# Patient Record
Sex: FEMALE | Employment: OTHER | URBAN - METROPOLITAN AREA
[De-identification: names, ages, dates, MRNs, and addresses within clinical notes are randomized per-mention and may not be internally consistent; named-entity substitution may affect disease eponyms.]

---

## 2024-02-05 NOTE — H&P (VIEW-ONLY)
mGycm Maximum CTDI Vol : 47.34 mGy FINDINGS:  No evidence of acute stroke, mass, or hemorrhage. No fracture or extra-axial collection. Mild white matter density alteration is nonspecific, but compatible with chronic small vessel ischemia. Included portions of the paranasal sinuses, mastoids, and orbits unremarkable.     No acute intracranial abnormality. - Note: Radiology results need to be interpreted within a comprehensive clinical context.  If you have questions about the radiology report, please contact the office of the ordering clinician.    Reviewed     Microbiology:  No results found for this visit on 24 (from the past 336 hour(s)).   Reviewed    EKG:  Results for orders placed during the hospital encounter of 24    EK EKG 12 LEAD    Impression  [facility]    Test Date:    2024  Pat Name:     SALLY CONTEH               Department:   [facility]  Patient ID:   [id number]                 Room:         [facility]  Gender:       Female                   Technician:   Aarti  :          1955               Requested By: PARKER IRAHETA  Order Number: [id number]                Reading MD:   Lefty Yao MD  Measurements  Intervals                              Axis  Rate:         66                       P:            33  AL:           127                      QRS:          -18  QRSD:         98                       T:            17  QT:           395  QTc:          415  Interpretive Statements  SINUS RHYTHM  LOW QRS VOLTAGE IN PRECORDIAL LEADS  COMPARED TO 24 THE QRS AXIS HAS BECOME NORMNAL AND NSSTTWA HAVE  DISAPPEARED  Electronically Signed On 2024 8:08:51 EST by Lefty Yao MD    Reviewed        This note was completed using voice recognition technology. Despite the writer's best efforts to proof read, it may still contain unintended errors. Please call with questions.

## 2024-02-14 NOTE — PROGRESS NOTES
[facility] Pre-Admission Testing Electronic Communication Worksheet for OR/ENDO Procedures        Patient: Kayla Faith    DOS:   2/22  Arrival Time: 12    Surgery Time:1325    Meds to Bed:  [x] YES    []  NO    Transportation Confirmed: [x] YES    []  NO    History and Physical:  [x] YES    []  NO  [] N/A  If yes, please list doctor or Urgent Care and date of H&P: per [facility] notes    Additional Clearance(Cardiac, Pulmonary, etc):  [] YES    [x]  NO    Pre-Admission Testing Visit:  [] YES    [x]  NO If no, do labs/testing need to be done DOS?  [] YES    [x]  NO    Medication Reconciliation Complete:  [x] YES    []  NO        Additional Notes:                Interview Complete: [x] YES    []  NO          Coty Chirinos RN  3:46 PM

## 2024-02-20 RX ORDER — PRAMIPEXOLE DIHYDROCHLORIDE 1.5 MG/1
1.5 TABLET ORAL NIGHTLY
COMMUNITY
Start: 2017-01-01

## 2024-02-20 RX ORDER — FLUDROCORTISONE ACETATE 0.1 MG/1
0.1 TABLET ORAL 2 TIMES DAILY
COMMUNITY
Start: 2022-01-12

## 2024-02-20 RX ORDER — OMEPRAZOLE 40 MG/1
40 CAPSULE, DELAYED RELEASE ORAL 2 TIMES DAILY
COMMUNITY
Start: 2017-01-01

## 2024-02-20 RX ORDER — ATORVASTATIN CALCIUM 40 MG/1
40 TABLET, FILM COATED ORAL NIGHTLY
COMMUNITY
Start: 2022-08-05

## 2024-02-20 RX ORDER — ONDANSETRON 4 MG/1
4 TABLET, ORALLY DISINTEGRATING ORAL EVERY 8 HOURS PRN
COMMUNITY

## 2024-02-20 RX ORDER — ERGOCALCIFEROL 1.25 MG/1
50000 CAPSULE ORAL WEEKLY
COMMUNITY

## 2024-02-20 RX ORDER — GABAPENTIN 400 MG/1
400 CAPSULE ORAL 3 TIMES DAILY
COMMUNITY
Start: 2023-03-11

## 2024-02-21 ENCOUNTER — ANESTHESIA EVENT (OUTPATIENT)
Dept: OPERATING ROOM | Age: 69
End: 2024-02-21
Payer: MEDICARE

## 2024-02-22 ENCOUNTER — HOSPITAL ENCOUNTER (OUTPATIENT)
Age: 69
Setting detail: OUTPATIENT SURGERY
Discharge: HOME | End: 2024-02-22
Attending: UROLOGY | Admitting: UROLOGY
Payer: MEDICARE

## 2024-02-22 ENCOUNTER — ANESTHESIA (OUTPATIENT)
Dept: OPERATING ROOM | Age: 69
End: 2024-02-22
Payer: MEDICARE

## 2024-02-22 VITALS
SYSTOLIC BLOOD PRESSURE: 109 MMHG | RESPIRATION RATE: 18 BRPM | TEMPERATURE: 97.5 F | DIASTOLIC BLOOD PRESSURE: 61 MMHG | HEIGHT: 65 IN | BODY MASS INDEX: 34.91 KG/M2 | WEIGHT: 209.55 LBS | HEART RATE: 77 BPM | OXYGEN SATURATION: 97 %

## 2024-02-22 PROCEDURE — 3600000002 HC SURGERY LEVEL 2 BASE: Performed by: UROLOGY

## 2024-02-22 PROCEDURE — 2709999900 HC NON-CHARGEABLE SUPPLY: Performed by: UROLOGY

## 2024-02-22 PROCEDURE — 7100000010 HC PHASE II RECOVERY - FIRST 15 MIN: Performed by: UROLOGY

## 2024-02-22 PROCEDURE — 3700000001 HC ADD 15 MINUTES (ANESTHESIA): Performed by: UROLOGY

## 2024-02-22 PROCEDURE — 2580000003 HC RX 258: Performed by: STUDENT IN AN ORGANIZED HEALTH CARE EDUCATION/TRAINING PROGRAM

## 2024-02-22 PROCEDURE — 6370000000 HC RX 637 (ALT 250 FOR IP): Performed by: UROLOGY

## 2024-02-22 PROCEDURE — 3600000012 HC SURGERY LEVEL 2 ADDTL 15MIN: Performed by: UROLOGY

## 2024-02-22 PROCEDURE — 2500000003 HC RX 250 WO HCPCS

## 2024-02-22 PROCEDURE — 2580000003 HC RX 258: Performed by: UROLOGY

## 2024-02-22 PROCEDURE — 6360000002 HC RX W HCPCS: Performed by: UROLOGY

## 2024-02-22 PROCEDURE — 7100000000 HC PACU RECOVERY - FIRST 15 MIN: Performed by: UROLOGY

## 2024-02-22 PROCEDURE — 7100000011 HC PHASE II RECOVERY - ADDTL 15 MIN: Performed by: UROLOGY

## 2024-02-22 PROCEDURE — 6360000002 HC RX W HCPCS

## 2024-02-22 PROCEDURE — 3700000000 HC ANESTHESIA ATTENDED CARE: Performed by: UROLOGY

## 2024-02-22 PROCEDURE — 7100000001 HC PACU RECOVERY - ADDTL 15 MIN: Performed by: UROLOGY

## 2024-02-22 PROCEDURE — L8606 SYNTHETIC IMPLNT URINARY 1ML: HCPCS | Performed by: UROLOGY

## 2024-02-22 DEVICE — BULKAMID URETHRAL BULKING SYSTEM 2ML
Type: IMPLANTABLE DEVICE | Site: BLADDER | Status: FUNCTIONAL
Brand: BULKAMID

## 2024-02-22 RX ORDER — SODIUM CHLORIDE 0.9 % (FLUSH) 0.9 %
5-40 SYRINGE (ML) INJECTION PRN
Status: DISCONTINUED | OUTPATIENT
Start: 2024-02-22 | End: 2024-02-22 | Stop reason: HOSPADM

## 2024-02-22 RX ORDER — PHENYLEPHRINE HCL IN 0.9% NACL 1 MG/10 ML
SYRINGE (ML) INTRAVENOUS PRN
Status: DISCONTINUED | OUTPATIENT
Start: 2024-02-22 | End: 2024-02-22 | Stop reason: SDUPTHER

## 2024-02-22 RX ORDER — FENTANYL CITRATE 50 UG/ML
INJECTION, SOLUTION INTRAMUSCULAR; INTRAVENOUS PRN
Status: DISCONTINUED | OUTPATIENT
Start: 2024-02-22 | End: 2024-02-22 | Stop reason: SDUPTHER

## 2024-02-22 RX ORDER — GLYCOPYRROLATE 0.2 MG/ML
INJECTION INTRAMUSCULAR; INTRAVENOUS PRN
Status: DISCONTINUED | OUTPATIENT
Start: 2024-02-22 | End: 2024-02-22 | Stop reason: SDUPTHER

## 2024-02-22 RX ORDER — SODIUM CHLORIDE 9 MG/ML
INJECTION, SOLUTION INTRAVENOUS PRN
Status: DISCONTINUED | OUTPATIENT
Start: 2024-02-22 | End: 2024-02-22 | Stop reason: HOSPADM

## 2024-02-22 RX ORDER — LIDOCAINE HYDROCHLORIDE 20 MG/ML
INJECTION, SOLUTION EPIDURAL; INFILTRATION; INTRACAUDAL; PERINEURAL PRN
Status: DISCONTINUED | OUTPATIENT
Start: 2024-02-22 | End: 2024-02-22 | Stop reason: SDUPTHER

## 2024-02-22 RX ORDER — SODIUM CHLORIDE 0.9 % (FLUSH) 0.9 %
5-40 SYRINGE (ML) INJECTION EVERY 12 HOURS SCHEDULED
Status: DISCONTINUED | OUTPATIENT
Start: 2024-02-22 | End: 2024-02-22 | Stop reason: HOSPADM

## 2024-02-22 RX ORDER — ONDANSETRON 2 MG/ML
4 INJECTION INTRAMUSCULAR; INTRAVENOUS
Status: DISCONTINUED | OUTPATIENT
Start: 2024-02-22 | End: 2024-02-22 | Stop reason: HOSPADM

## 2024-02-22 RX ORDER — FENTANYL CITRATE 0.05 MG/ML
25 INJECTION, SOLUTION INTRAMUSCULAR; INTRAVENOUS EVERY 5 MIN PRN
Status: DISCONTINUED | OUTPATIENT
Start: 2024-02-22 | End: 2024-02-22 | Stop reason: HOSPADM

## 2024-02-22 RX ORDER — MAGNESIUM HYDROXIDE 1200 MG/15ML
LIQUID ORAL
Status: COMPLETED | OUTPATIENT
Start: 2024-02-22 | End: 2024-02-22

## 2024-02-22 RX ORDER — PROPOFOL 10 MG/ML
INJECTION, EMULSION INTRAVENOUS PRN
Status: DISCONTINUED | OUTPATIENT
Start: 2024-02-22 | End: 2024-02-22 | Stop reason: SDUPTHER

## 2024-02-22 RX ADMIN — GLYCOPYRROLATE 0.2 MG: 0.2 INJECTION INTRAMUSCULAR; INTRAVENOUS at 14:08

## 2024-02-22 RX ADMIN — PROPOFOL 50 MG: 10 INJECTION, EMULSION INTRAVENOUS at 13:53

## 2024-02-22 RX ADMIN — FENTANYL CITRATE 25 MCG: 50 INJECTION INTRAMUSCULAR; INTRAVENOUS at 14:04

## 2024-02-22 RX ADMIN — Medication 200 MCG: at 14:12

## 2024-02-22 RX ADMIN — PROPOFOL 50 MG: 10 INJECTION, EMULSION INTRAVENOUS at 13:56

## 2024-02-22 RX ADMIN — PROPOFOL 50 MG: 10 INJECTION, EMULSION INTRAVENOUS at 13:51

## 2024-02-22 RX ADMIN — Medication 100 MCG: at 14:01

## 2024-02-22 RX ADMIN — LIDOCAINE HYDROCHLORIDE 80 MG: 20 INJECTION, SOLUTION EPIDURAL; INFILTRATION; INTRACAUDAL; PERINEURAL at 13:51

## 2024-02-22 RX ADMIN — Medication 200 MCG: at 14:18

## 2024-02-22 RX ADMIN — Medication 100 MCG: at 14:06

## 2024-02-22 RX ADMIN — SODIUM CHLORIDE: 9 INJECTION, SOLUTION INTRAVENOUS at 12:41

## 2024-02-22 RX ADMIN — Medication 200 MCG: at 14:21

## 2024-02-22 RX ADMIN — CEFTRIAXONE 2000 MG: 2 INJECTION, POWDER, FOR SOLUTION INTRAMUSCULAR; INTRAVENOUS at 13:51

## 2024-02-22 RX ADMIN — FENTANYL CITRATE 25 MCG: 50 INJECTION INTRAMUSCULAR; INTRAVENOUS at 14:03

## 2024-02-22 RX ADMIN — PROPOFOL 150 MCG/KG/MIN: 10 INJECTION, EMULSION INTRAVENOUS at 13:52

## 2024-02-22 ASSESSMENT — PAIN - FUNCTIONAL ASSESSMENT
PAIN_FUNCTIONAL_ASSESSMENT: NONE - DENIES PAIN
PAIN_FUNCTIONAL_ASSESSMENT: 0-10
PAIN_FUNCTIONAL_ASSESSMENT: NONE - DENIES PAIN
PAIN_FUNCTIONAL_ASSESSMENT: NONE - DENIES PAIN

## 2024-02-22 NOTE — INTERVAL H&P NOTE
Update History & Physical    The patient's History and Physical  was reviewed with the patient and I examined the patient. There was no change. The surgical site was confirmed by the patient and me.     Plan: The risks, benefits, expected outcome, and alternative to the recommended procedure have been discussed with the patient. Patient understands and wants to proceed with the procedure.     Electronically signed by Yesi Bustamante MD on 2/22/2024 at 1:31 PM

## 2024-02-22 NOTE — PROGRESS NOTES
Received from PACU. Admitted to Phase 2 care.  Awake and alert, respirations easy and even.  Oriented to room and surroundings.  No complaints.

## 2024-02-22 NOTE — OP NOTE
bladder.  I pulled the scope back to the level of the mid urethra about 2 cm back from the bladder neck.  The Bulkamid was then injected in 0.5 mL increments.  I injected 0.5 mL at the 7 o'clock position followed by 0.5 mL at the 5 o'clock position, 0.5 mL at the 1 o'clock position, and 0.5 mL at the 11 o'clock position.  This resulted in excellent coaptation.  The scope was withdrawn.  A red rubber catheter was used to drain the bladder.  Then, the catheter was withdrawn.  The patient was awakened and transferred to Recovery.    POSTOPERATIVE PLAN:  She will have a voiding trial in the recovery room and follow up in the office in 2 weeks.          FABI ESPINOSA MD      D:  02/22/2024 14:22:15     T:  02/22/2024 15:09:53     CLAY/ALYSA  Job #:  [id number]     Doc#:  [id number]

## 2024-02-22 NOTE — PROGRESS NOTES
Pt states urge to urinate, placed on bedpan. Small amount of urine noted. Marry care performed. Pt still denies pain or nausea at this time, aware will need to urinate before Dc.

## 2024-02-22 NOTE — PROGRESS NOTES
Assisted to and from BR to void.  States she voided \"good\".  States she is ready to go home.  No complaints.

## 2024-02-22 NOTE — PROGRESS NOTES
Pt to pacu from OR. Pt asleep with oral airway in place, on 6L via nc per CRNA, placed on monitor, vss. Abdo soft to touch, no pain with palpation. No sings of distress noted at this time, report obtained.

## 2024-02-22 NOTE — DISCHARGE INSTRUCTIONS
Please make sure the patient is able to urinate prior to discharge home.    No diet or activity restrictions from urology standpoint.    A small amount of blood in the urine or burning with urination is expected.    Call for inability to urinate or fever    Follow up in the office in 2 weeks

## 2024-02-22 NOTE — PROGRESS NOTES
Pt chewing on oral airway removed. Pt oriented to pacu, denies pain or nausea at this time, readjusted to sit up in bed, on 3L via Nc at this time, sating 95%. Falls back to sleep easily.

## 2024-02-22 NOTE — PROGRESS NOTES
Tolerating oral intake and being up on side of bed.  Discharge instructions given to patient and friend Guadalupe.  Verbalize understanding.  No complaints.

## 2024-02-22 NOTE — BRIEF OP NOTE
Brief Postoperative Note      Patient: Kayla Faith  YOB: 1955  MRN: [id number]    Date of Procedure: 2/22/2024    Pre-Op Diagnosis Codes:     * Female stress incontinence [N39.3], urge incontinence    Post-Op Diagnosis: Same       Procedure(s):  CYSTOSCOPY WITH INJECTION OF URETHRAL BULKING AGENT- BULKAMID, AND INTRAVESICAL INJECTION  UNITS OF BOTOX    Surgeon(s):  Yesi Bustamante MD    Assistant:  Surgical Assistant: Ginny Smyth    Anesthesia: Monitor Anesthesia Care    Estimated Blood Loss (mL): Minimal    Complications: None    Specimens:   * No specimens in log *    Implants:  Implant Name Type Inv. Item Serial No.  Lot No. LRB No. Used Action   SYSTEM BULKING PRC BULKAMID URETHRAL - [id number]  SYSTEM BULKING PRC BULKAMID URETHRAL  [employer] [id number] N/A 1 Implanted         Drains: * No LDAs found *    Findings: normal cysto      Electronically signed by Yesi Bustamante MD on 2/22/2024 at 2:18 PM

## 2024-02-22 NOTE — PROGRESS NOTES
Pt resting comfortably in bed, still denies pain or nausea at this time, states is ready to move to phase 2, vss. No signs of distress noted at this time.

## 2024-02-22 NOTE — ANESTHESIA POSTPROCEDURE EVALUATION
[facility] Department of Anesthesiology  Post-Anesthesia Note       Name:  Kayla Faith                                  Age:  68 y.o.  MRN:  [id number]     Last Vitals & Oxygen Saturation: /61   Pulse 77   Temp 97.5 °F (36.4 °C) (Temporal)   Resp 18   Ht 1.651 m (5' 5\")   Wt 95.1 kg (209 lb 8.8 oz)   LMP  (Approximate)   SpO2 97%   BMI 34.87 kg/m²   Patient Vitals for the past 4 hrs:   BP Temp Temp src Pulse Resp SpO2 Height Weight   02/22/24 1547 109/61 -- -- 77 18 97 % -- --   02/22/24 1510 109/74 97.5 °F (36.4 °C) Temporal 76 18 98 % -- --   02/22/24 1506 121/74 -- -- 75 12 97 % -- --   02/22/24 1503 -- -- -- 74 -- -- -- --   02/22/24 1500 115/63 -- -- 73 14 96 % -- --   02/22/24 1445 118/66 -- -- 93 22 98 % -- --   02/22/24 1440 110/69 -- -- 84 25 97 % -- --   02/22/24 1435 139/70 -- -- 82 18 96 % -- --   02/22/24 1430 (!) 100/54 -- -- 81 16 94 % -- --   02/22/24 1425 (!) 99/57 -- -- 85 10 94 % -- --   02/22/24 1424 -- -- -- 87 -- -- -- --   02/22/24 1423 (!) 121/55 (!) 96.7 °F (35.9 °C) Temporal 82 15 94 % -- --   02/22/24 1231 122/67 97.8 °F (36.6 °C) Temporal 67 16 96 % 1.651 m (5' 5\") 95.1 kg (209 lb 8.8 oz)       Level of consciousness:  Awake, alert    Respiratory: Respirations easy, no distress. Stable.    Cardiovascular: Hemodynamically stable.    Hydration: Adequate.    PONV: Adequately managed.    Post-op pain: Adequately controlled.    Post-op assessment: Tolerated anesthetic well without complication.    Complications:  None.    Compa Oneal MD  February 22, 2024   4:04 PM

## 2024-02-22 NOTE — ANESTHESIA PRE PROCEDURE
[facility] Department of Anesthesiology  Pre-Anesthesia Evaluation/Consultation       Name:  Kayla Faith  : 1955  Age:  68 y.o.                                           MRN:  [id number]  Date: 2024           Surgeon: Surgeon(s):  Yesi Bustamante MD    Procedure: Procedure(s):  CYSTOSCOPY WITH INJECTION OF URETHRAL BULKING AGENT- BULKAMID, AND INTRAVESICAL INJECTION  UNITS OF BOTOX     Allergies   Allergen Reactions    Adhesive Tape Itching     Redness, itching, Eats the skin away   Redness, itching, Eats the skin away    Redness, itching, Eats the skin away    Codeine Nausea And Vomiting, Itching and Other (See Comments)     Other reaction(s): GI intolerance, GI Upset    Morphine Anaphylaxis and Shortness Of Breath    Hydrocodone-Acetaminophen     Midodrine Other (See Comments)     Other reaction(s): Other (See Comments), Unknown   Headaches    Headaches     Headaches     Headaches     There is no problem list on file for this patient.    Past Medical History:   Diagnosis Date    Anxiety     Arthritis     CAD (coronary artery disease)     Chronic back pain     Chronic bronchitis (HCC)     COVID-19         Depression     Fibromyalgia     GERD (gastroesophageal reflux disease)     Hiatal hernia     History of left below knee amputation (HCC)     Hyperlipidemia     Myocardial infarct (HCC)     SIU (nonalcoholic steatohepatitis)     Sleep apnea     Stroke (cerebrum) (HCC)     Syncope     Syncope and collapse     Thyroid nodule     URI, acute     Urinary retention with incomplete bladder emptying      Past Surgical History:   Procedure Laterality Date    ABDOMEN SURGERY      2x    APPENDECTOMY      BACK SURGERY      BLADDER SURGERY      CARDIAC CATHETERIZATION      CHOLECYSTECTOMY      COLECTOMY      COLONOSCOPY      CYSTOSCOPY      ENDOSCOPY, COLON, DIAGNOSTIC      EYE SURGERY      FOOT SURGERY      17x    GASTRIC FUNDOPLICATION      HERNIA REPAIR      HYSTERECTOMY (CERVIX STATUS

## 2024-12-04 ENCOUNTER — HOSPITAL ENCOUNTER (OUTPATIENT)
Dept: NUCLEAR MEDICINE | Age: 69
Discharge: HOME | End: 2024-12-04
Payer: MEDICARE

## 2024-12-04 DIAGNOSIS — G20.A1 PARKINSON'S DISEASE, UNSPECIFIED WHETHER DYSKINESIA PRESENT, UNSPECIFIED WHETHER MANIFESTATIONS FLUCTUATE (HCC): ICD-10-CM

## 2024-12-04 PROCEDURE — A9584 IODINE I-123 IOFLUPANE: HCPCS | Performed by: STUDENT IN AN ORGANIZED HEALTH CARE EDUCATION/TRAINING PROGRAM

## 2024-12-04 PROCEDURE — 2580000003 HC RX 258: Performed by: STUDENT IN AN ORGANIZED HEALTH CARE EDUCATION/TRAINING PROGRAM

## 2024-12-04 PROCEDURE — 3430000000 HC RX DIAGNOSTIC RADIOPHARMACEUTICAL: Performed by: STUDENT IN AN ORGANIZED HEALTH CARE EDUCATION/TRAINING PROGRAM

## 2024-12-04 PROCEDURE — 6370000000 HC RX 637 (ALT 250 FOR IP): Performed by: STUDENT IN AN ORGANIZED HEALTH CARE EDUCATION/TRAINING PROGRAM

## 2024-12-04 PROCEDURE — 78803 RP LOCLZJ TUM SPECT 1 AREA: CPT

## 2024-12-04 RX ORDER — SODIUM CHLORIDE 0.9 % (FLUSH) 0.9 %
10 SYRINGE (ML) INJECTION PRN
Status: DISCONTINUED | OUTPATIENT
Start: 2024-12-04 | End: 2024-12-05 | Stop reason: HOSPADM

## 2024-12-04 RX ORDER — IODINE SOLUTION STRONG 5% (LUGOL'S) 5 %
4 SOLUTION ORAL ONCE
Status: COMPLETED | OUTPATIENT
Start: 2024-12-04 | End: 2024-12-04

## 2024-12-04 RX ADMIN — IOFLUPANE I-123 4.54 MILLICURIE: 2 INJECTION, SOLUTION INTRAVENOUS at 11:20

## 2024-12-04 RX ADMIN — SODIUM CHLORIDE, PRESERVATIVE FREE 10 ML: 5 INJECTION INTRAVENOUS at 11:20

## 2024-12-04 RX ADMIN — IODINE SOLUTION STRONG 5% (LUGOL'S) 0.2 ML: 5 SOLUTION at 09:56

## (undated) DEVICE — GOWN SIRUS NONREIN XL W/TWL: Brand: MEDLINE INDUSTRIES, INC.

## (undated) DEVICE — CYSTOSCOPY: Brand: MEDLINE INDUSTRIES, INC.

## (undated) DEVICE — SOLUTION IRRIG 3000ML STRL H2O USP UROMATIC PLAS CONT

## (undated) DEVICE — SYRINGE MED 10ML TRNSLUC BRL PLUNG BLK MRK POLYPR CTRL

## (undated) DEVICE — STOPCOCK ANGIO 1050PSI 1 W LUERLOCK FIX M FIT HI PRSS ST

## (undated) DEVICE — Device: Brand: INJETAK ADJUSTABLE TIP NEEDLE 70CM

## (undated) DEVICE — MERCY HEALTH WEST TURNOVER: Brand: MEDLINE INDUSTRIES, INC.

## (undated) DEVICE — CATHETER URETH 12FR L16IN RED RUB INTMIT ROB MOD BARDX

## (undated) DEVICE — SOLUTION IRRIG 1000ML STRL H2O USP PLAS POUR BTL

## (undated) DEVICE — GLOVE SURG SZ 65 CRM LTX FREE POLYISOPRENE POLYMER BEAD ANTI